# Patient Record
Sex: MALE | NOT HISPANIC OR LATINO | URBAN - METROPOLITAN AREA
[De-identification: names, ages, dates, MRNs, and addresses within clinical notes are randomized per-mention and may not be internally consistent; named-entity substitution may affect disease eponyms.]

---

## 2024-02-01 ENCOUNTER — INPATIENT (INPATIENT)
Facility: HOSPITAL | Age: 29
LOS: 3 days | Discharge: HOME | DRG: 754 | End: 2024-02-05
Attending: PSYCHIATRY & NEUROLOGY | Admitting: PSYCHIATRY & NEUROLOGY
Payer: MEDICAID

## 2024-02-01 VITALS — HEIGHT: 68 IN | WEIGHT: 138.89 LBS

## 2024-02-01 DIAGNOSIS — F32.9 MAJOR DEPRESSIVE DISORDER, SINGLE EPISODE, UNSPECIFIED: ICD-10-CM

## 2024-02-01 LAB
ALBUMIN SERPL ELPH-MCNC: 4.1 G/DL — SIGNIFICANT CHANGE UP (ref 3.3–5)
ALP SERPL-CCNC: 111 U/L — SIGNIFICANT CHANGE UP (ref 40–120)
ALT FLD-CCNC: 36 U/L — SIGNIFICANT CHANGE UP (ref 12–78)
AMPHET UR-MCNC: NEGATIVE — SIGNIFICANT CHANGE UP
ANION GAP SERPL CALC-SCNC: 1 MMOL/L — LOW (ref 5–17)
APAP SERPL-MCNC: < 2 UG/ML (ref 10–30)
APPEARANCE UR: ABNORMAL
AST SERPL-CCNC: 9 U/L — LOW (ref 15–37)
BACTERIA # UR AUTO: NEGATIVE /HPF — SIGNIFICANT CHANGE UP
BARBITURATES UR SCN-MCNC: NEGATIVE — SIGNIFICANT CHANGE UP
BASOPHILS # BLD AUTO: 0.02 K/UL — SIGNIFICANT CHANGE UP (ref 0–0.2)
BASOPHILS NFR BLD AUTO: 0.2 % — SIGNIFICANT CHANGE UP (ref 0–2)
BENZODIAZ UR-MCNC: NEGATIVE — SIGNIFICANT CHANGE UP
BILIRUB SERPL-MCNC: 0.6 MG/DL — SIGNIFICANT CHANGE UP (ref 0.2–1.2)
BILIRUB UR-MCNC: NEGATIVE — SIGNIFICANT CHANGE UP
BUN SERPL-MCNC: 10 MG/DL — SIGNIFICANT CHANGE UP (ref 7–23)
CALCIUM SERPL-MCNC: 9.7 MG/DL — SIGNIFICANT CHANGE UP (ref 8.5–10.1)
CHLORIDE SERPL-SCNC: 107 MMOL/L — SIGNIFICANT CHANGE UP (ref 96–108)
CO2 SERPL-SCNC: 31 MMOL/L — SIGNIFICANT CHANGE UP (ref 22–31)
COCAINE METAB.OTHER UR-MCNC: NEGATIVE — SIGNIFICANT CHANGE UP
COLOR SPEC: SIGNIFICANT CHANGE UP
CREAT SERPL-MCNC: 0.95 MG/DL — SIGNIFICANT CHANGE UP (ref 0.5–1.3)
DIFF PNL FLD: NEGATIVE — SIGNIFICANT CHANGE UP
EGFR: 110 ML/MIN/1.73M2 — SIGNIFICANT CHANGE UP
EOSINOPHIL # BLD AUTO: 0.09 K/UL — SIGNIFICANT CHANGE UP (ref 0–0.5)
EOSINOPHIL NFR BLD AUTO: 0.8 % — SIGNIFICANT CHANGE UP (ref 0–6)
ETHANOL SERPL-MCNC: <10 MG/DL — SIGNIFICANT CHANGE UP (ref 0–10)
GLUCOSE SERPL-MCNC: 108 MG/DL — HIGH (ref 70–99)
GLUCOSE UR QL: NEGATIVE MG/DL — SIGNIFICANT CHANGE UP
HCT VFR BLD CALC: 48.6 % — SIGNIFICANT CHANGE UP (ref 39–50)
HGB BLD-MCNC: 16.3 G/DL — SIGNIFICANT CHANGE UP (ref 13–17)
IMM GRANULOCYTES NFR BLD AUTO: 0.3 % — SIGNIFICANT CHANGE UP (ref 0–0.9)
KETONES UR-MCNC: NEGATIVE MG/DL — SIGNIFICANT CHANGE UP
LEUKOCYTE ESTERASE UR-ACNC: NEGATIVE — SIGNIFICANT CHANGE UP
LYMPHOCYTES # BLD AUTO: 19.2 % — SIGNIFICANT CHANGE UP (ref 13–44)
LYMPHOCYTES # BLD AUTO: 2.2 K/UL — SIGNIFICANT CHANGE UP (ref 1–3.3)
MCHC RBC-ENTMCNC: 29.9 PG — SIGNIFICANT CHANGE UP (ref 27–34)
MCHC RBC-ENTMCNC: 33.5 GM/DL — SIGNIFICANT CHANGE UP (ref 32–36)
MCV RBC AUTO: 89 FL — SIGNIFICANT CHANGE UP (ref 80–100)
METHADONE UR-MCNC: NEGATIVE — SIGNIFICANT CHANGE UP
MONOCYTES # BLD AUTO: 0.7 K/UL — SIGNIFICANT CHANGE UP (ref 0–0.9)
MONOCYTES NFR BLD AUTO: 6.1 % — SIGNIFICANT CHANGE UP (ref 2–14)
NEUTROPHILS # BLD AUTO: 8.39 K/UL — HIGH (ref 1.8–7.4)
NEUTROPHILS NFR BLD AUTO: 73.4 % — SIGNIFICANT CHANGE UP (ref 43–77)
NITRITE UR-MCNC: NEGATIVE — SIGNIFICANT CHANGE UP
OPIATES UR-MCNC: NEGATIVE — SIGNIFICANT CHANGE UP
PCP SPEC-MCNC: SIGNIFICANT CHANGE UP
PCP UR-MCNC: NEGATIVE — SIGNIFICANT CHANGE UP
PH UR: 8 — SIGNIFICANT CHANGE UP (ref 5–8)
PLATELET # BLD AUTO: 221 K/UL — SIGNIFICANT CHANGE UP (ref 150–400)
POTASSIUM SERPL-MCNC: 3.5 MMOL/L — SIGNIFICANT CHANGE UP (ref 3.5–5.3)
POTASSIUM SERPL-SCNC: 3.5 MMOL/L — SIGNIFICANT CHANGE UP (ref 3.5–5.3)
PROT SERPL-MCNC: 7.5 GM/DL — SIGNIFICANT CHANGE UP (ref 6–8.3)
PROT UR-MCNC: SIGNIFICANT CHANGE UP MG/DL
RBC # BLD: 5.46 M/UL — SIGNIFICANT CHANGE UP (ref 4.2–5.8)
RBC # FLD: 12.2 % — SIGNIFICANT CHANGE UP (ref 10.3–14.5)
RBC CASTS # UR COMP ASSIST: 0 /HPF — SIGNIFICANT CHANGE UP (ref 0–4)
SALICYLATES SERPL-MCNC: <1.7 MG/DL (ref 2.8–20)
SARS-COV-2 RNA SPEC QL NAA+PROBE: SIGNIFICANT CHANGE UP
SODIUM SERPL-SCNC: 139 MMOL/L — SIGNIFICANT CHANGE UP (ref 135–145)
SP GR SPEC: 1.03 — SIGNIFICANT CHANGE UP (ref 1–1.03)
SQUAMOUS # UR AUTO: 0 /HPF — SIGNIFICANT CHANGE UP (ref 0–5)
THC UR QL: POSITIVE — SIGNIFICANT CHANGE UP
TSH SERPL-MCNC: 0.88 UU/ML — SIGNIFICANT CHANGE UP (ref 0.34–4.82)
UROBILINOGEN FLD QL: 1 MG/DL — SIGNIFICANT CHANGE UP (ref 0.2–1)
WBC # BLD: 11.43 K/UL — HIGH (ref 3.8–10.5)
WBC # FLD AUTO: 11.43 K/UL — HIGH (ref 3.8–10.5)
WBC UR QL: 0 /HPF — SIGNIFICANT CHANGE UP (ref 0–5)

## 2024-02-01 PROCEDURE — 80061 LIPID PANEL: CPT

## 2024-02-01 PROCEDURE — 83036 HEMOGLOBIN GLYCOSYLATED A1C: CPT

## 2024-02-01 PROCEDURE — 93010 ELECTROCARDIOGRAM REPORT: CPT

## 2024-02-01 PROCEDURE — 36415 COLL VENOUS BLD VENIPUNCTURE: CPT

## 2024-02-01 PROCEDURE — 99285 EMERGENCY DEPT VISIT HI MDM: CPT

## 2024-02-01 PROCEDURE — 84443 ASSAY THYROID STIM HORMONE: CPT

## 2024-02-01 RX ADMIN — Medication 1 MILLIGRAM(S): at 13:44

## 2024-02-01 NOTE — ED PROVIDER NOTE - OBJECTIVE STATEMENT
27 yo male w/no reported psychiatric history presents to the ED requesting mental health evaluation. Pt states for the last 4 months he has been feeling more down and depressed due to some stressors in his life. Pt recently  fro his wife, is having difficulties with immigration, with keeping his job, and with his living situation. Pt feels like "everything is stacking up". Pt has never felt like this in the past, and states he is sleeping in 30-45 minutes intervals each night. Reporting some auditory hallucinations "like the walls are breathing", and states he hears some voices in his head. Reporting recent 35 lb weight loss over the last 4 months as well. Does not have a psychiatrist that he sees. Pt used to use marijuana but does not anymore, denies any other drug use. Pt also states has been feeling more anxious lately, when he gets chest pressure, shaky, and has difficulty breathing.

## 2024-02-01 NOTE — BH PATIENT PROFILE - BRADEN MOISTURE
Spoke with patient and advised her that her home health was already approved again and they had set her up for an appt for 11/29/2022. I advised her to call [facility] to discuss setting up appts.   (4) rarely moist

## 2024-02-01 NOTE — ED BEHAVIORAL HEALTH ASSESSMENT NOTE - RISK ASSESSMENT
High acute risk: severe depression anxiety, insomnia, hopelessness anhedonia, patient is depression and thoughts about dying, no motivation to continue living, poor or no social supports, homeless, generalized, with issues with immigration office patient is at high acute suicide risk.  In addition he cannot function, he is not moving out of bed and doing nothing.  Increased  Long-term risk based on criteria describing acute risk.  Protective factors: None known.  Mitigation of risk:: psychiatry treatment to start the medication, psychotherapy and safety plan.

## 2024-02-01 NOTE — ED PROVIDER NOTE - PHYSICAL EXAMINATION
GENERAL: A&Ox4, non-toxic appearing, no acute distress  HEENT: NCAT, EOMI, oral mucosa moist, normal conjunctiva  RESP: CTAB, no respiratory distress, no wheezes/rhonchi/rales, speaking in full sentences  CV: RRR, no murmurs/rubs/gallops  ABDOMEN: soft, non-tender, non-distended, no guarding, no rebound tenderness  MSK: no visible deformities  NEURO: no focal sensory or motor deficits, CN 2-12 grossly intact  SKIN: warm, normal color, well perfused, no rash  PSYCH: flat affect, depressed

## 2024-02-01 NOTE — ED ADULT TRIAGE NOTE - CHIEF COMPLAINT QUOTE
patient ambulatory into ED requesting "mental health assessment". pt endorses feeling of depression over the past 4 months as well as suicidal thoughts. denies plan or access to weapons. endorses stressor of separation from wife, finances, work, immigration, etc. pt follows with a therapist but is non-compliant, states it has been "on and off". denies remote dx of mental health illness or family hx of mental health illness. pt reports it feels like his life is "like a movie", endorses hearing voices but denies command auditory hallucinations, also denies visual hallucinations. states "I wish I got this addressed months ago". seeking psychiatric evaluation. 1:1 initiated from triage, pt to be brought to behavioral health room.

## 2024-02-01 NOTE — ED BEHAVIORAL HEALTH ASSESSMENT NOTE - HPI (INCLUDE ILLNESS QUALITY, SEVERITY, DURATION, TIMING, CONTEXT, MODIFYING FACTORS, ASSOCIATED SIGNS AND SYMPTOMS)
PT is a 29 YO Bots[place] male,  but  from wife who resides in [facility], unemployed, limited to no support, experiencing  severely  depressed mood in the light of multiple stressors, including conflicts with [facility] and [facility] to [facility], immigration  problems, unemployment, limited support, no family fr the last 3 months that is getting progressively worse to the point  of being hopeless and thinking that life makes no sense and there is no point  of living any more, came to ED seeking help.  Patient is reporting very severe depression, was lasting at least 3 months, after his wife left him in the life of marital conflicts and the history obtained further protection against him, after that he lost job [place] and lost his apartment.  He claimed to [place] 3 days ago to stay with his friend Parish, telephone number [phone] in [place].  Patient states that he cannot sleep, energy level is low, he has no motivation and states in bed, he lost appetite and lost 35 pounds in the last 3 months, he still has poor appetite and cannot eat, he is not able to engage in any activity, his concentration is poor is staring, he has thoughts of guilt and life not being worth living with wishes to die.  He denies that currently having thoughts of plan to kill himself or intent.  He is very anxious and repeats that is getting worse.  In ED he is shaking and saying he is very anxious because of all environment and all the situation.  He is blaming vomit emergency room for his anxiety despite the full stressors happening to him.  He denies hearing voices but states that he has his own voices that bother him.  He states " it is not like voices are telling me something I have these bad thoughts".  He repeats he has no hope for future and he wants to die.  Patient denies paranoid thinking or any other delusional thinking.  He denies history of trauma and states that he does not drink and does not smoke at this point of time.  He used to smoke and drink but not anymore.  Patient does not family in [facility], his mother lives in [place] and he has no siblings.  His father is not in the picture and he does not know where he is.  As his collateral information he provides the telephone number of his friend Parish: [phone], his wife (he is still ) telephone n who whom he describes as his umber [phone] and that he is a family friend Betsy brother because he is close to him and his telephone is [phone] who resides in [place].  [place] came [place] 3 days ago. Patient has a interview in immigration office on January 7, 2024

## 2024-02-01 NOTE — ED BEHAVIORAL HEALTH ASSESSMENT NOTE - NSACTIVEVENT_PSY_ALL_CORE
Triggering events leading to humiliation, shame, and/or despair (e.g., Loss of relationship, financial or health status) (real or anticipated)/Pending incarceration or homelessness/Inadequate social supports/Recent onset of psychiatric illness

## 2024-02-01 NOTE — ED PROVIDER NOTE - CARE PLAN
1 Principal Discharge DX:	Current episode of major depressive disorder without prior episode, unspecified depression episode severity

## 2024-02-01 NOTE — ED PROVIDER NOTE - NS_BEDUNITTYPES_ED_ALL_ED
Potential access sites were evaluated for patency using ultrasound.   The right jugular vein was selected. Access was obtained under with Sonosite guidance using a standard 18 guage needle with direct visualization of needle entry.      PSYCHIATRY

## 2024-02-01 NOTE — ED BEHAVIORAL HEALTH ASSESSMENT NOTE - NSBHMSESPEECH_PSY_A_CORE
Normal volume, rate, productivity, spontaneity and articulation Adjacent Tissue Transfer Text: The defect edges were debeveled with a #15 scalpel blade.  Given the location of the defect and the proximity to free margins an adjacent tissue transfer was deemed most appropriate.  Using a sterile surgical marker, an appropriate flap was drawn incorporating the defect and placing the expected incisions within the relaxed skin tension lines where possible.    The area thus outlined was incised deep to adipose tissue with a #15 scalpel blade.  The skin margins were undermined to an appropriate distance in all directions utilizing iris scissors.

## 2024-02-01 NOTE — ED ADULT NURSE NOTE - ACTIVATING EVENTS/STRESSORS
Triggering events leading to humiliation, shame, and/or despair (e.g. Loss of relationship, financial or health status) (real or anticipated)/Legal problems/Inadequate social supports

## 2024-02-01 NOTE — ED BEHAVIORAL HEALTH ASSESSMENT NOTE - PAST PSYCHOTROPIC MEDICATION
Patient states he saw psychiatrist once in [place] 3 months ago and that he was prescribed some medication,

## 2024-02-01 NOTE — ED PROVIDER NOTE - PROGRESS NOTE DETAILS
All labs personally reviewed.  Patient has mild leukocytosis without any infectious symptoms, urinalysis negative, CMP nonactionable, drug screen pending, COVID-negative.  Patient seen by psychiatry team with plan for voluntary admission.  Patient to be admitted to [facility].

## 2024-02-01 NOTE — ED ADULT NURSE NOTE - NSFALLHARMRISKINTERV_ED_ALL_ED

## 2024-02-01 NOTE — ED ADULT NURSE NOTE - OBJECTIVE STATEMENT
Pt ambulatory to ED seeking psych eval. Pt reports having increased feeling of depression and anxiety over few weeks. Pt reports he recently moved from [place],  from wife, undergoing immigrational issues. Pt reports staying with his friend who advised him to come to  ED to be evaluated by Psychiatrist. Pt reports feeling of suicidal ideation, without a plan. Denies any previous attempts. Denies HI. Pt reports brief hospitalization at a psych clinic in [place] last year. Reports feelings of hopelessness about his situation- no medical insurance and unemployed. Pt visibly anxious, fidgeting, medicated with Lorazepam PO. EKG in progress, labs sent. Plan of care explained to pt with verbalized understanding. Pending psych re eval and dispo at this time. On constant observation for safety. Medications secured in pharmacy, seal slip in pt chart #[id number]

## 2024-02-01 NOTE — ED BEHAVIORAL HEALTH ASSESSMENT NOTE - SUMMARY
PT is a 29 YO [place] male,  but  from wife who resides in [facility], unemployed, limited to no support, experiencing  severely  depressed mood in the light of multiple stressors, including conflicts with [facility] and moved to [facility], immigration  problems, unemployment, limited support, no family fr the last 3 months that is getting progressively worse to the point  of being hopeless and thinking that life makes no sense and there is no point  of living any more, came to ED seeking help.  Patient is reporting very severe depression, was lasting at least 3 months, after his wife left him in the life of marital conflicts and the history obtained further protection against him, after that he lost job [place] and lost his apartment.  He claimed to [place] 3 days ago to stay with his friend Parish, telephone number [phone] in [place].  Patient states that he cannot sleep, energy level is low, he has no motivation and states in bed, he lost appetite and lost 35 pounds in the last 3 months, he still has poor appetite and cannot eat, he is not able to engage in any activity, his concentration is poor is staring, he has thoughts of guilt and life not being worth living with wishes to die.  He denies that currently having thoughts of plan to kill himself or intent.  He is very anxious and repeats that is getting worse.  In ED he is shaking and saying he is very anxious because of all environment and all the situation.  He is blaming vomit emergency room for his anxiety despite the full stressors happening to him.  He denies hearing voices but states that he has his own voices that bother him.  He states " it is not like voices are telling me something I have these bad thoughts".  He repeats he has no hope for future and he wants to die.  Patient denies paranoid thinking or any other delusional thinking.  He denies history of trauma and states that he does not drink and does not smoke at this point of time.  He used to smoke and drink but not anymore.  Patient does not family in [facility], his mother lives in [place] and he has no siblings.  His father is not in the picture and he does not know where he is.  As his collateral information he provides the telephone number of his friend Parish: [phone], his wife (he is still ) telephone n who whom he describes as his umber [phone] and that he is a family friend Betsy brother because he is close to him and his telephone is [phone] who resides in [place].  Patient came from room [place] to [facility] 3 days ago. Patient has a interview in immigration office on January 7, 2024    Considering all of the above, severe depression anxiety, insomnia, hopelessness anhedonia, patient is signs depression and thoughts about dying, no motivation to continue living, poor or no social supports, homeless, generalized, with issues with immigration office patient is at high acute suicide risk.  In addition he cannot function, he is not moving out of bed and doing nothing.    This is the first episode of depression, he is not an indication, he is undecided and ambivalent about treatment.    Plan:    Hold for reassessment pending full medical workup, labs, UTI, U tox, EKG COVID testing, respiratory infection testing.    Patient will benefit from inpatient psychiatry admission and he thinks about signs of voluntarily.    In case the patient injects 1 to admission, it is my opinion that he is high suicide risk, and unable to function and provide care for himself and meets criteria for emergency admission.    Provide Ativan 1 mg p.o. every 6 hours as needed for anxiety,.    Care coordinated with JOHAN Berrios in signout to follow-up on patient once medical workup is completed.

## 2024-02-01 NOTE — ED BEHAVIORAL HEALTH ASSESSMENT NOTE - DESCRIPTION
Patient is very anxious with poor response to reassurance redirection.  Requires Ativan 1 mg as needed for agitation. Patient is jobless, homeless, unemployed,  from wife and having immigration issues. DENIES

## 2024-02-01 NOTE — BH PATIENT PROFILE - FALL HARM RISK - UNIVERSAL INTERVENTIONS
Bed in lowest position, wheels locked, appropriate side rails in place/Call bell, personal items and telephone in reach/Instruct patient to call for assistance before getting out of bed or chair/Non-slip footwear when patient is out of bed/[place] to call system/Physically safe environment - no spills, clutter or unnecessary equipment/Purposeful Proactive Rounding/Room/bathroom lighting operational, light cord in reach

## 2024-02-01 NOTE — ED PROVIDER NOTE - CLINICAL SUMMARY MEDICAL DECISION MAKING FREE TEXT BOX
27 yo male with depression, likely due to current difficult life events. Plan for medical clearance, psych consult, and reassess.

## 2024-02-02 DIAGNOSIS — F43.20 ADJUSTMENT DISORDER, UNSPECIFIED: ICD-10-CM

## 2024-02-02 LAB
A1C WITH ESTIMATED AVERAGE GLUCOSE RESULT: 5.4 % — SIGNIFICANT CHANGE UP (ref 4–5.6)
CHOLEST SERPL-MCNC: 170 MG/DL — SIGNIFICANT CHANGE UP
ESTIMATED AVERAGE GLUCOSE: 108 MG/DL — SIGNIFICANT CHANGE UP (ref 68–114)
HDLC SERPL-MCNC: 41 MG/DL — SIGNIFICANT CHANGE UP
LIPID PNL WITH DIRECT LDL SERPL: 113 MG/DL — HIGH
NON HDL CHOLESTEROL: 129 MG/DL — SIGNIFICANT CHANGE UP
TRIGL SERPL-MCNC: 89 MG/DL — SIGNIFICANT CHANGE UP
TSH SERPL-MCNC: 1.47 UU/ML — SIGNIFICANT CHANGE UP (ref 0.34–4.82)

## 2024-02-02 PROCEDURE — 99221 1ST HOSP IP/OBS SF/LOW 40: CPT

## 2024-02-02 PROCEDURE — 99223 1ST HOSP IP/OBS HIGH 75: CPT

## 2024-02-02 RX ORDER — MIRTAZAPINE 45 MG/1
7.5 TABLET, ORALLY DISINTEGRATING ORAL AT BEDTIME
Refills: 0 | Status: DISCONTINUED | OUTPATIENT
Start: 2024-02-02 | End: 2024-02-04

## 2024-02-02 RX ADMIN — Medication 1 MILLIGRAM(S): at 00:20

## 2024-02-02 RX ADMIN — Medication 1 MILLIGRAM(S): at 12:52

## 2024-02-02 RX ADMIN — MIRTAZAPINE 7.5 MILLIGRAM(S): 45 TABLET, ORALLY DISINTEGRATING ORAL at 20:29

## 2024-02-02 NOTE — BH INPATIENT PSYCHIATRY ASSESSMENT NOTE - NSBHCHARTREVIEWINVESTIGATE_PSY_A_CORE FT
< from: 12 Lead ECG (02.01.24 @ 14:13) >    Ventricular Rate 98 BPM    Atrial Rate 98 BPM    P-R Interval 134 ms    QRS Duration 88 ms    Q-T Interval 346 ms    QTC Calculation(Bazett) 441 ms    P Axis 55 degrees    R Axis 61 degrees    T Axis 40 degrees    Diagnosis Line Normal sinus rhythm  Normal ECG  No previous ECGs available  Confirmed by MD RIANNA, JANETT ([id number]) on 2/1/2024 6:24:16 PM    < end of copied text >

## 2024-02-02 NOTE — BH INPATIENT PSYCHIATRY ASSESSMENT NOTE - NSBHASSESSSUMMFT_PSY_ALL_CORE
Patient is reporting very severe depression, was lasting at least 3 months, after his wife left him in the life of marital conflicts and the history obtained further protection against him, after that he lost job [place] and lost his apartment.  He claimed to [place] 3 days ago to stay with his friend Parish, telephone number [phone] in [place].  Patient states that he cannot sleep, energy level is low, he has no motivation and states in bed, he lost appetite and lost 35 pounds in the last 3 months, he still has poor appetite and cannot eat, he is not able to engage in any activity, his concentration is poor is staring, he has thoughts of guilt and life not being worth living with wishes to die.  He denies that currently having thoughts of plan to kill himself or intent.  He is very anxious and repeats that is getting worse.  In ED he is shaking and saying he is very anxious because of all environment and all the situation.  He is blaming vomit emergency room for his anxiety despite the full stressors happening to him.  He denies hearing voices but states that he has his own voices that bother him.  He states " it is not like voices are telling me something I have these bad thoughts".  He repeats he has no hope for future and he wants to die.  Patient denies paranoid thinking or any other delusional thinking.    He denies history of trauma and states that he does not drink and does not smoke at this point of time.  He used to smoke and drink but not anymore.  Patient does not have family in [facility], his mother lives in [place] and he has no siblings.  His father is not in the picture and he does not know where he is.  As his collateral information he provides the telephone number of his friend Parish: [phone], his wife (he is still ) telephone n who whom he describes as his umber [phone] and that he is a family friend Betsy brother because he is close to him and his telephone is [phone] who resides in [place].  [place] came [place] to [place]  3 days ago. Patient has a interview in immigration office on January 7, 2024   Patient is reporting feeling depressed, initially has SI, no SI now, and no prior SA. he feels stressed due to loss of  Job/Home/Separation from wife/impending divorce/immigration papers etc. with poor sleep for past 3 months. He feels that he need to calm down and has no SI now, agreed to take Remeron 7.5 mg HS and discussed meds s/e , benefits and advantages. To be observed for few days and he also has immigration interview on 02/07/2024, and feels that he has to leave early on the 5th of February to help with the . He also agreed to go for after care f/u as per SW referral.

## 2024-02-02 NOTE — BH SOCIAL WORK INITIAL PSYCHOSOCIAL EVALUATION - NSBHSATHC_PSY_A_CORE FT
Pt. reports heavy cannabis use when attending college. He reports reduced use in past 3 months. He endorsed last use approx. 4 days ago.

## 2024-02-02 NOTE — BH SOCIAL WORK INITIAL PSYCHOSOCIAL EVALUATION - INSURANCE HELP - DETAILS
Pt. with no active health insurance. He states he is not eligible due to immigration status. Referred to Financial Counseling.

## 2024-02-02 NOTE — H&P ADULT - NSHPLABSRESULTS_GEN_ALL_CORE
Urinalysis Basic - ( 2024 13:11 )    Color: Dark Yellow / Appearance: Turbid / S.027 / pH: x  Gluc: 108 mg/dL / Ketone: Negative mg/dL  / Bili: Negative / Urobili: 1.0 mg/dL   Blood: x / Protein: Trace mg/dL / Nitrite: Negative   Leuk Esterase: Negative / RBC: 0 /HPF / WBC 0 /HPF   Sq Epi: x / Non Sq Epi: 0 /HPF / Bacteria: Negative /HPF    2024 13:11    139    |  107    |  10     ----------------------------<  108    3.5     |  31     |  0.95     Ca    9.7        2024 13:11    TPro  7.5    /  Alb  4.1    /  TBili  0.6    /  DBili  x      /  AST  9      /  ALT  36     /  AlkPhos  111    2024 13:11  LIVER FUNCTIONS - ( 2024 13:11 )  Alb: 4.1 g/dL / Pro: 7.5 gm/dL / ALK PHOS: 111 U/L / ALT: 36 U/L / AST: 9 U/L / GGT: x         CBC Full  -  ( 2024 13:11 )  WBC Count : 11.43 K/uL  Hemoglobin : 16.3 g/dL  Hematocrit : 48.6 %  Platelet Count - Automated : 221 K/uL  Mean Cell Volume : 89.0 fl  Mean Cell Hemoglobin : 29.9 pg  Mean Cell Hemoglobin Concentration : 33.5 gm/dL  Auto Neutrophil # : 8.39 K/uL  Auto Lymphocyte # : 2.20 K/uL  Auto Monocyte # : 0.70 K/uL  Auto Eosinophil # : 0.09 K/uL  Auto Basophil # : 0.02 K/uL  Auto Neutrophil % : 73.4 %  Auto Lymphocyte % : 19.2 %  Auto Monocyte % : 6.1 %  Auto Eosinophil % : 0.8 %  Auto Basophil % : 0.2 %

## 2024-02-02 NOTE — BH SOCIAL WORK INITIAL PSYCHOSOCIAL EVALUATION - MONEY FOR FOOD - DETAILS
Pt. reports no income at this time and worries about supporting himself. SW to f/u with [facility] referral.

## 2024-02-02 NOTE — BH SOCIAL WORK INITIAL PSYCHOSOCIAL EVALUATION - NSPTSTATEDGOAL_PSY_ALL_CORE
3-point gait
"To help get medication and learn coping for current stress. I was looking for someone to talk to."

## 2024-02-02 NOTE — BH INPATIENT PSYCHIATRY ASSESSMENT NOTE - NSBHCHARTREVIEWLAB_PSY_A_CORE FT
16.3   11.43 )-----------( 221      ( 01 Feb 2024 13:11 )             48.6   02-01    139  |  107  |  10  ----------------------------<  108<H>  3.5   |  31  |  0.95    Ca    9.7      01 Feb 2024 13:11    TPro  7.5  /  Alb  4.1  /  TBili  0.6  /  DBili  x   /  AST  9<L>  /  ALT  36  /  AlkPhos  111  02-01

## 2024-02-02 NOTE — BH INPATIENT PSYCHIATRY ASSESSMENT NOTE - CURRENT MEDICATION
MEDICATIONS  (STANDING):  mirtazapine 7.5 milliGRAM(s) Oral at bedtime    MEDICATIONS  (PRN):  LORazepam     Tablet 1 milliGRAM(s) Oral every 6 hours PRN Anxiety

## 2024-02-02 NOTE — BH SOCIAL WORK INITIAL PSYCHOSOCIAL EVALUATION - NSBHBARRIERS_PSY_ALL_CORE
Pt. identifies stressors w/ his wife and inability to talk to her due to OOP as barriers/Financial difficulties/Lack of support

## 2024-02-02 NOTE — BH INPATIENT PSYCHIATRY ASSESSMENT NOTE - HPI (INCLUDE ILLNESS QUALITY, SEVERITY, DURATION, TIMING, CONTEXT, MODIFYING FACTORS, ASSOCIATED SIGNS AND SYMPTOMS)
HPI: PT is a 29 YO  male,  but  from wife who resides in [facility], unemployed, limited to no support, experiencing  severely  depressed mood in the light of multiple stressors, including conflicts with [facility] and [facility] to [facility], immigration  problems, unemployment, limited support, no family fr the last 3 months that is getting progressively worse to the point  of being hopeless and thinking that life makes no sense and there is no point  of living any more, came to ED seeking help.    Patient is reporting very severe depression, was lasting at least 3 months, after his wife left him in the life of marital conflicts and the history obtained further protection against him, after that he lost job [place] and lost his apartment.  He claimed to [place] 3 days ago to stay with his friend Parish, telephone number [phone] in [place].  Patient states that he cannot sleep, energy level is low, he has no motivation and states in bed, he lost appetite and lost 35 pounds in the last 3 months, he still has poor appetite and cannot eat, he is not able to engage in any activity, his concentration is poor is staring, he has thoughts of guilt and life not being worth living with wishes to die.  He denies that currently having thoughts of plan to kill himself or intent.  He is very anxious and repeats that is getting worse.  In ED he is shaking and saying he is very anxious because of all environment and all the situation.  He is blaming vomit emergency room for his anxiety despite the full stressors happening to him.  He denies hearing voices but states that he has his own voices that bother him.  He states " it is not like voices are telling me something I have these bad thoughts".  He repeats he has no hope for future and he wants to die.  Patient denies paranoid thinking or any other delusional thinking.  He denies history of trauma and states that he does not drink and does not smoke at this point of time.  He used to smoke and drink but not anymore.  Patient does not family in [facility], his mother lives in [place] and he has no siblings.  His father is not in the picture and he does not know where he is.  As his collateral information he provides the telephone number of his friend Parish: [phone], his wife (he is still ) telephone n who whom he describes as his umber [phone] and that he is a family friend Betsy brother because he is close to him and his telephone is [phone] who resides in [place].  Patient came [place] to [place] 3 days ago. Patient has a interview in immigration office on January 7, 2024

## 2024-02-02 NOTE — BH SOCIAL WORK INITIAL PSYCHOSOCIAL EVALUATION - NSBHSAALC_PSY_A_CORE FT
Pt. endorses heavy ETOH use when employed in a [employer]. He stopped alcohol drink few months ago, last use 3 days ago 1 corona beer.

## 2024-02-02 NOTE — PSYCHIATRIC REHAB INITIAL EVALUATION - NSBHEDULEVEL_PSY_ALL_CORE
Addended by: EDDY WILDE on: 11/21/2023 03:16 PM     Modules accepted: Orders    
Addended by: EDDY WILDE on: 11/21/2023 03:18 PM     Modules accepted: Orders    
High (Secondary) School

## 2024-02-02 NOTE — BH SOCIAL WORK INITIAL PSYCHOSOCIAL EVALUATION - NSBHHOUSECOMMENTFT_PSY_ALL_CORE
Pt. is currently staying w/ friend Parish in [place]:  [address], [place] [address]. Pt. reports he can return there at d/c.  He states prior he was living with his wife in [place] but since separation he and wife lost the apt. and have been in separate addresses. Pt. states he has place also to stay with friend in [place] for his upcoming court appt. He intends to return to [place] until he can figure out his next housing.  Pt.Cell Phone # is [phone].

## 2024-02-02 NOTE — H&P ADULT - NSHPPHYSICALEXAM_GEN_ALL_CORE
Vital Signs Last 24 Hrs  T(C): 36.7 (02 Feb 2024 08:48), Max: 37.4 (01 Feb 2024 12:24)  T(F): 98 (02 Feb 2024 08:48), Max: 99.3 (01 Feb 2024 12:24)  HR: 79 (01 Feb 2024 23:24) (79 - 96)  BP: 126/82 (01 Feb 2024 23:24) (115/78 - 130/87)  BP(mean): 92 (01 Feb 2024 22:02) (83 - 100)  RR: 16 (02 Feb 2024 08:48) (16 - 19)  SpO2: 100% (02 Feb 2024 08:48) (97% - 100%)    Parameters below as of 01 Feb 2024 22:02  Patient On (Oxygen Delivery Method): room air    HEENT:   pupils equal and reactive, EOMI, no oropharyngeal lesions, erythema, exudates, oral thrush    NECK:   supple, no carotid bruits, no palpable lymph nodes, no thyromegaly    CV:  +S1, +S2, regular, no murmurs or rubs    RESP:   lungs clear to auscultation bilaterally, no wheezing, rales, rhonchi, good air entry bilaterally    BREAST:  not examined    GI:  abdomen soft, non-tender, non-distended, normal BS, no bruits, no abdominal masses, no palpable masses    RECTAL:  not examined    :  not examined    MSK:   normal muscle tone, no atrophy, no rigidity, no contractions    EXT:   no clubbing, no cyanosis, no edema, no calf pain, swelling or erythema    VASCULAR:  pulses equal and symmetric in the upper and lower extremities    NEURO:  AAOX3, no focal neurological deficits, follows all commands, able to move extremities spontaneously    SKIN:  no ulcers, lesions or rashes

## 2024-02-02 NOTE — BH SOCIAL WORK INITIAL PSYCHOSOCIAL EVALUATION - JOB HELP - DETAILS
Pt. 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 He reports since  from his wife, he lost his friends also and his job. 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

## 2024-02-02 NOTE — BH SOCIAL WORK INITIAL PSYCHOSOCIAL EVALUATION - NSBHGOALSUCCESSFT_PSY_ALL_CORE
Pt. 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

## 2024-02-02 NOTE — BH SOCIAL WORK INITIAL PSYCHOSOCIAL EVALUATION - NSHIGHRISKBEHFT_PSY_ALL_CORE
Pt. reports hx. 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 He also reports heavy cannabis use during college years.
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
## 2024-02-02 NOTE — BH INPATIENT PSYCHIATRY ASSESSMENT NOTE - NSCOMMENTSUICRISKFACT_PSY_ALL_CORE
Low risk-No prior SI/SA, denied drug abuse, stopped drinking  Alcohol months ago, last drink beer Corona 1 bottle 3 days earlier

## 2024-02-02 NOTE — BH INPATIENT PSYCHIATRY ASSESSMENT NOTE - NSBHMETABOLIC_PSY_ALL_CORE_FT
BMI: BMI (kg/m2): 23 (02-01-24 @ 23:24)  HbA1c:   Glucose:   BP: 126/82 (02-01-24 @ 23:24) (115/78 - 130/87)Vital Signs Last 24 Hrs  T(C): 36.7 (02-02-24 @ 08:48), Max: 37.4 (02-01-24 @ 12:24)  T(F): 98 (02-02-24 @ 08:48), Max: 99.3 (02-01-24 @ 12:24)  HR: 79 (02-01-24 @ 23:24) (79 - 96)  BP: 126/82 (02-01-24 @ 23:24) (115/78 - 130/87)  BP(mean): 92 (02-01-24 @ 22:02) (83 - 100)  RR: 16 (02-02-24 @ 08:48) (16 - 19)  SpO2: 100% (02-02-24 @ 08:48) (97% - 100%)    Orthostatic VS  02-02-24 @ 08:48  Lying BP: --/-- HR: --  Sitting BP: 117/81 HR: 88  Standing BP: 128/83 HR: 96  Site: upper right arm  Mode: electronic    Lipid Panel: Date/Time: 02-02-24 @ 08:07  Cholesterol, Serum: 170  LDL Cholesterol Calculated: 113  HDL Cholesterol, Serum: 41  Total Cholesterol/HDL Ration Measurement: --  Triglycerides, Serum: 89   BMI: BMI (kg/m2): 23 (02-01-24 @ 23:24)  HbA1c: A1C with Estimated Average Glucose Result: 5.4 % (02-02-24 @ 08:07)    Glucose:   BP: 126/82 (02-01-24 @ 23:24) (115/78 - 130/87)Vital Signs Last 24 Hrs  T(C): 36.7 (02-02-24 @ 08:48), Max: 37.2 (02-01-24 @ 15:19)  T(F): 98 (02-02-24 @ 08:48), Max: 98.9 (02-01-24 @ 15:19)  HR: 79 (02-01-24 @ 23:24) (79 - 92)  BP: 126/82 (02-01-24 @ 23:24) (115/78 - 126/82)  BP(mean): 92 (02-01-24 @ 22:02) (83 - 92)  RR: 16 (02-02-24 @ 08:48) (16 - 18)  SpO2: 100% (02-02-24 @ 08:48) (98% - 100%)    Orthostatic VS  02-02-24 @ 08:48  Lying BP: --/-- HR: --  Sitting BP: 117/81 HR: 88  Standing BP: 128/83 HR: 96  Site: upper right arm  Mode: electronic    Lipid Panel: Date/Time: 02-02-24 @ 08:07  Cholesterol, Serum: 170  LDL Cholesterol Calculated: 113  HDL Cholesterol, Serum: 41  Total Cholesterol/HDL Ration Measurement: --  Triglycerides, Serum: 89

## 2024-02-02 NOTE — H&P ADULT - ASSESSMENT
28 year old man with recent social stressors, separation from wife who has restraining order against him, now presents with sergey, for suicidal ideation.   Currently under inpatient psychiatry service and treatment.       Patient with no acute medical problems  Reccomend to resume care as per pyschiatry for his depression and anxiety      Medicine will sign off , please call prn if any new medical concerns arise.

## 2024-02-02 NOTE — BH SAFETY PLAN - ENVIRONMENT SAFETY 2:
I will call Parish, [phone], Betsy, [phone] or [phone] immediately if I am having  thoughts t harm myself.

## 2024-02-02 NOTE — BH SOCIAL WORK INITIAL PSYCHOSOCIAL EVALUATION - NSBHCHILDRESP_PSY_ALL_CORE
Pt.  but  to wife Joann Styles, no children, has mother in [place], no siblings, no contact w/ father./No
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
## 2024-02-02 NOTE — BH INPATIENT PSYCHIATRY ASSESSMENT NOTE - NSBHCHARTREVIEWVS_PSY_A_CORE FT
Vital Signs Last 24 Hrs  T(C): 36.7 (02-02-24 @ 08:48), Max: 37.4 (02-01-24 @ 12:24)  T(F): 98 (02-02-24 @ 08:48), Max: 99.3 (02-01-24 @ 12:24)  HR: 79 (02-01-24 @ 23:24) (79 - 96)  BP: 126/82 (02-01-24 @ 23:24) (115/78 - 130/87)  BP(mean): 92 (02-01-24 @ 22:02) (83 - 100)  RR: 16 (02-02-24 @ 08:48) (16 - 19)  SpO2: 100% (02-02-24 @ 08:48) (97% - 100%)    Orthostatic VS  02-02-24 @ 08:48  Lying BP: --/-- HR: --  Sitting BP: 117/81 HR: 88  Standing BP: 128/83 HR: 96  Site: upper right arm  Mode: electronic   Vital Signs Last 24 Hrs  T(C): 36.7 (02-02-24 @ 08:48), Max: 37.2 (02-01-24 @ 15:19)  T(F): 98 (02-02-24 @ 08:48), Max: 98.9 (02-01-24 @ 15:19)  HR: 79 (02-01-24 @ 23:24) (79 - 92)  BP: 126/82 (02-01-24 @ 23:24) (115/78 - 126/82)  BP(mean): 92 (02-01-24 @ 22:02) (83 - 92)  RR: 16 (02-02-24 @ 08:48) (16 - 18)  SpO2: 100% (02-02-24 @ 08:48) (98% - 100%)    Orthostatic VS  02-02-24 @ 08:48  Lying BP: --/-- HR: --  Sitting BP: 117/81 HR: 88  Standing BP: 128/83 HR: 96  Site: upper right arm  Mode: electronic
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
## 2024-02-02 NOTE — BH INPATIENT PSYCHIATRY ASSESSMENT NOTE - NSTXDCOTHRGOAL_PSY_ALL_CORE
Pt. will have appt. for ongoing mental health care within 5-7 days of discharge.   Pt. will have safe housing upon discharge.   Pt. is insured and benefits are in place.   SW to explore w/ pt. the interest and need for substance use referral for discharge.

## 2024-02-03 PROCEDURE — 99232 SBSQ HOSP IP/OBS MODERATE 35: CPT

## 2024-02-03 RX ADMIN — MIRTAZAPINE 7.5 MILLIGRAM(S): 45 TABLET, ORALLY DISINTEGRATING ORAL at 19:59

## 2024-02-03 RX ADMIN — Medication 1 MILLIGRAM(S): at 23:01

## 2024-02-04 PROCEDURE — 99232 SBSQ HOSP IP/OBS MODERATE 35: CPT

## 2024-02-04 RX ORDER — MIRTAZAPINE 45 MG/1
15 TABLET, ORALLY DISINTEGRATING ORAL AT BEDTIME
Refills: 0 | Status: DISCONTINUED | OUTPATIENT
Start: 2024-02-04 | End: 2024-02-05

## 2024-02-04 RX ADMIN — MIRTAZAPINE 15 MILLIGRAM(S): 45 TABLET, ORALLY DISINTEGRATING ORAL at 20:27

## 2024-02-05 VITALS — OXYGEN SATURATION: 100 % | TEMPERATURE: 98 F | RESPIRATION RATE: 16 BRPM

## 2024-02-05 PROCEDURE — 99239 HOSP IP/OBS DSCHRG MGMT >30: CPT

## 2024-02-05 RX ORDER — MIRTAZAPINE 45 MG/1
1 TABLET, ORALLY DISINTEGRATING ORAL
Qty: 14 | Refills: 0
Start: 2024-02-05 | End: 2024-02-18

## 2024-02-05 NOTE — BH DISCHARGE NOTE NURSING/SOCIAL WORK/PSYCH REHAB - NSDPDISTO_PSY_ALL_CORE
Pt. will be staying w/ friend on [place]:  [address], [place] [address]  Pt. cell: [phone]  Friend Parish Wyman: [phone]/Home

## 2024-02-05 NOTE — BH DISCHARGE NOTE NURSING/SOCIAL WORK/PSYCH REHAB - NSDCADDINFO2FT_PSY_ALL_CORE
You have an IN-PERSON initial psychiatric evaluation scheduled for: Thurs. 2/15 at 1:30pm w/ Dr. Jaffe. It is very important that you attend this appointment to continue with your medication management.

## 2024-02-05 NOTE — BH INPATIENT PSYCHIATRY PROGRESS NOTE - NSBHFUPINTERVALHXFT_PSY_A_CORE
Is irregular very patient states that he feels better and he explains that just shock of being on inpatient unit was eye-opener.  He he continues to worry about his immigration status.  He is asking for letter from our unit to clarify that he is in the hospital and cannot make it on Tuesday, February 7.  Left leg is to be sent to his .  Patient says that he was able to sleep and he is eating better.  He is mood is improving.  He currently denies any thoughts about harming himself or anybody else.  He is expressing more hope about his future and immigration process.  He denies any voices or seeing things.  He denies any paranoia.  Patient insist his wife or ex-wife to be called.
Patient's wife did not call.  He is informed that left message for her as he requested.    Patient is visibly noted and he is trying his best to convince this writer that he is doing a lot better and he does not need to stay here.  In that sense he denies being depressed or anxious.  He denies having thoughts about harming self or anybody else.  He denies any paranoid thinking.  He denies anhedonia at this time and denies hopelessness.    
02/05/2024: Patient was seen today AM, chart reviewed and discussed in team. Patient is visible in unit, alert, oriented to time and place. He endorses that he feels much better, had his emotions in control by being here and feels that since he started the meds he is able to sleep well and have a better understanding  mood with no anxiety and SI now. He denied any prior SA  and feels that he need to face the  for his G. Card so he may have permanent status. He would go home and stay with his friend for now and to have 2 weeks supply of meds given from . He will f/u as per  referral.    Patient's wife did not call.  He is informed that left message for her as he requested.    Patient is visibly noted and he is trying his best to convince this writer that he is doing a lot better and he does not need to stay here.  In that sense he denies being depressed or anxious.  He denies having thoughts about harming self or anybody else.  He denies any paranoid thinking.  He denies anhedonia at this time and denies hopelessness.

## 2024-02-05 NOTE — BH INPATIENT PSYCHIATRY DISCHARGE NOTE - NSBHSAALC_PSY_A_CORE FT
Pt. endorses heavy ETOH use when employed in a [employer]. He stopped alcohol drink few months ago, last use 3 days ago 1 corona beer.

## 2024-02-05 NOTE — BH DISCHARGE NOTE NURSING/SOCIAL WORK/PSYCH REHAB - FLU SEASON?
----- Message from Angel Flaherty MD sent at 11/22/2021  9:49 AM EST -----  No change  How is he feeling  ----- Message -----  From: Micheal Cooper  Sent: 11/22/2021   8:57 AM EST  To: Angel Flaherty MD    INR- 2.7
Patient informed. Dr Miguel Co informed that patient is feeling a little better and has an appointment scheduled with Dr Lacho Calvo this week.
Yes...

## 2024-02-05 NOTE — BH INPATIENT PSYCHIATRY PROGRESS NOTE - NSCGIIMPROVESX_PSY_ALL_CORE
2 = Much improved - notably better with signficant reduction of symptoms; increase in the level of functioning but some symptoms remain
3 = Minimally improved - slightly better with little or no clinically meaningful reduction of symptoms.  Represents very little change in basic clinical status, level of care, or functional capacity.
4 = No change - symptoms remain essentially unchanged

## 2024-02-05 NOTE — BH INPATIENT PSYCHIATRY DISCHARGE NOTE - NSBHASSESSSUMMFT_PSY_ALL_CORE
Patient is reporting feeling depressed, initially has SI, no SI now, and no prior SA. he feels stressed due to loss of  Job/Home/Separation from wife/impending divorce/immigration papers etc. with poor sleep for past 3 months. He feels that he need to calm down and has no SI now, agreed to take Remeron 7.5 mg HS and discussed meds s/e , benefits and advantages. To be observed for few days and he also has immigration interview on 02/07/2024, and feels that he has to leave early on the 5th of February to help with the . He also agreed to go for after care f/u as per  referral.    2/3/24:  Patient states that being in the inpatient unit is eye-opener for him and he feels slightly better.  However still depressed and anxious.  Worries about his immigration status.  He wants his wife to be contacted . This writer called [phone] message that Moody is in the hospital and they can call [phone]  Patient acute risk factors being mitigated by inpatient stay and medication.  His sleep is better.  He is still depressed and anxious but less he continues to worry.  He denies thoughts about killing himself, at this point he does not want to die and he is less hopeless.  He is expressing hope about completing his immigration process successfully and find a job.    2/4/24:  Patient states that he feels much better and he wants to be discharged.  He has no insurance and he worries about his financial situation.  Writer informed him that message was left for his wife but she never called back.  Patient states that he is doing really good and he wants to be discharged.    Increase Remeron to 15 mg at at bedtime.

## 2024-02-05 NOTE — BH TREATMENT PLAN - NSTXPLANTHERAPYSESSIONSFT_PSY_ALL_CORE
02-04-24  Type of therapy: Coping skills, Psychoeducation  Type of session: Group  Level of patient participation: Engaged, Participates  Duration of participation: 60 minutes  Therapy conducted by: Psych rehab  Therapy Summary: Patient attended group therapy and recreation today.

## 2024-02-05 NOTE — BH INPATIENT PSYCHIATRY PROGRESS NOTE - NSCGISEVERILLNESS_PSY_ALL_CORE
4 = Moderately ill – overt symptoms causing noticeable, but modest, functional impairment or distress; symptom level may warrant medication
3 = Mildly ill – clearly established symptoms with minimal, if any, distress or difficulty in social and occupational function
5 = Markedly ill - intrusive symptoms that distinctly impair social/occupational function or cause intrusive levels of distress

## 2024-02-05 NOTE — BH INPATIENT PSYCHIATRY PROGRESS NOTE - NSBHMETABOLIC_PSY_ALL_CORE_FT
BMI: BMI (kg/m2): 23 (02-01-24 @ 23:24)  HbA1c: A1C with Estimated Average Glucose Result: 5.4 % (02-02-24 @ 08:07)    Glucose:   BP: --Vital Signs Last 24 Hrs  T(C): 36.4 (02-05-24 @ 07:10), Max: 36.4 (02-05-24 @ 07:10)  T(F): 97.6 (02-05-24 @ 07:10), Max: 97.6 (02-05-24 @ 07:10)  HR: --  BP: --  BP(mean): --  RR: 16 (02-05-24 @ 07:10) (16 - 16)  SpO2: 100% (02-05-24 @ 07:10) (100% - 100%)    Orthostatic VS  02-05-24 @ 07:10  Lying BP: --/-- HR: --  Sitting BP: 121/84 HR: 94  Standing BP: 134/83 HR: 96  Site: upper right arm  Mode: electronic  Orthostatic VS  02-04-24 @ 07:40  Lying BP: --/-- HR: --  Sitting BP: 129/90 HR: 86  Standing BP: 121/92 HR: 97  Site: upper right arm  Mode: electronic    Lipid Panel: Date/Time: 02-02-24 @ 08:07  Cholesterol, Serum: 170  LDL Cholesterol Calculated: 113  HDL Cholesterol, Serum: 41  Total Cholesterol/HDL Ration Measurement: --  Triglycerides, Serum: 89  
BMI: BMI (kg/m2): 23 (02-01-24 @ 23:24)  HbA1c: A1C with Estimated Average Glucose Result: 5.4 % (02-02-24 @ 08:07)    Glucose:   BP: 126/82 (02-01-24 @ 23:24) (115/78 - 130/87)Vital Signs Last 24 Hrs  T(C): 36.7 (02-03-24 @ 08:24), Max: 36.7 (02-03-24 @ 08:24)  T(F): 98.1 (02-03-24 @ 08:24), Max: 98.1 (02-03-24 @ 08:24)  HR: --  BP: --  BP(mean): --  RR: 18 (02-03-24 @ 08:24) (18 - 18)  SpO2: 100% (02-03-24 @ 08:24) (100% - 100%)    Orthostatic VS  02-03-24 @ 08:24  Lying BP: --/-- HR: --  Sitting BP: 132/82 HR: 102  Standing BP: 153/84 HR: 105  Site: upper right arm  Mode: electronic  Orthostatic VS  02-02-24 @ 08:48  Lying BP: --/-- HR: --  Sitting BP: 117/81 HR: 88  Standing BP: 128/83 HR: 96  Site: upper right arm  Mode: electronic    Lipid Panel: Date/Time: 02-02-24 @ 08:07  Cholesterol, Serum: 170  LDL Cholesterol Calculated: 113  HDL Cholesterol, Serum: 41  Total Cholesterol/HDL Ration Measurement: --  Triglycerides, Serum: 89  
BMI: BMI (kg/m2): 23 (02-01-24 @ 23:24)  HbA1c: A1C with Estimated Average Glucose Result: 5.4 % (02-02-24 @ 08:07)    Glucose:   BP: 126/82 (02-01-24 @ 23:24) (115/78 - 130/87)Vital Signs Last 24 Hrs  T(C): 36.4 (02-04-24 @ 07:40), Max: 36.4 (02-04-24 @ 07:40)  T(F): 97.6 (02-04-24 @ 07:40), Max: 97.6 (02-04-24 @ 07:40)  HR: --  BP: --  BP(mean): --  RR: 2 (02-04-24 @ 07:40) (2 - 2)  SpO2: 99% (02-04-24 @ 07:40) (99% - 99%)    Orthostatic VS  02-04-24 @ 07:40  Lying BP: --/-- HR: --  Sitting BP: 129/90 HR: 86  Standing BP: 121/92 HR: 97  Site: upper right arm  Mode: electronic  Orthostatic VS  02-03-24 @ 08:24  Lying BP: --/-- HR: --  Sitting BP: 132/82 HR: 102  Standing BP: 153/84 HR: 105  Site: upper right arm  Mode: electronic    Lipid Panel: Date/Time: 02-02-24 @ 08:07  Cholesterol, Serum: 170  LDL Cholesterol Calculated: 113  HDL Cholesterol, Serum: 41  Total Cholesterol/HDL Ration Measurement: --  Triglycerides, Serum: 89
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
## 2024-02-05 NOTE — BH INPATIENT PSYCHIATRY DISCHARGE NOTE - NSBHFUPINTERVALHXFT_PSY_A_CORE
02/05/2024: Patient was seen today AM, chart reviewed and discussed in team. Patient is visible in unit, alert, oriented to time and place. He endorses that he feels much better, had his emotions in control by being here and feels that since he started the meds he is able to sleep well and have a better understanding  mood with no anxiety and SI now. He denied any prior SA  and feels that he need to face the  for his G. Card so he may have permanent status. He would go home and stay with his friend for now and to have 2 weeks supply of meds given from . He will f/u as per  referral.    Patient's wife did not call.  He is informed that left message for her as he requested.    Patient is visibly noted and he is trying his best to convince this writer that he is doing a lot better and he does not need to stay here.  In that sense he denies being depressed or anxious.  He denies having thoughts about harming self or anybody else.  He denies any paranoid thinking.  He denies anhedonia at this time and denies hopelessness.

## 2024-02-05 NOTE — BH TREATMENT PLAN - NSTXPROBDEPRES_PSY_ALL_CORE
Luis Fernando Josue is a 81 year old male who is here for medication follow up.  He is doing well.  He tried a different mask and is sleeping well.       He denies any chest pains or dyspnea.  No leg swelling.  He walks 5 days a week for 30 minutes.    I have reviewed and updated this patient's allergies, medications, problem list, social history, and family history today.    Current Outpatient Medications   Medication Sig   • metoPROLOL tartrate (LOPRESSOR) 25 MG tablet TAKE 1 TABLET BY MOUTH EVERY TWELVE HOURS    • atorvastatin (LIPITOR) 10 MG tablet TAKE 1 TABLET BY MOUTH ONE TIME A DAY AT NIGHT   • Glucosamine-Chondroitin (GLUCOSAMINE CHONDR COMPLEX) 500-400 MG Cap Take 1 tablet by mouth 2 times daily.    • ketoconazole (NIZORAL) 2 % cream Apply 1 application topically daily as needed. Indications: Skin Infection due to Candida Yeast   • aspirin 81 MG tablet Take 1 tablet by mouth daily.   • Doxylamine Succinate, Sleep, (SLEEP AID PO) Take  by mouth.     No current facility-administered medications for this visit.        Review of system:   see HPI; otherwise denies HEENT, NECK, RESP, CARDIAC, GI, , NEURO or PSYCH Sx    Physical Exam:  Visit Vitals  /58 (BP Location: RUE - Right upper extremity, Patient Position: Sitting, Cuff Size: Regular)   Pulse 60   Resp 20   Ht 5' 8\" (1.727 m)   Wt 95.5 kg   BMI 32.01 kg/m²     General appearance: alert, appears stated age, cooperative and no distress  Head: Normocephalic, without obvious abnormality, atraumatic  Neck: no adenopathy, no carotid bruit, no JVD and supple, symmetrical, trachea midline  Lungs: clear to auscultation bilaterally  Heart: regular rate and rhythm, S1, S2 normal, no murmur, click, rub or gallop  Extremities: no edema, redness or tenderness in the calves or thighs  Neurologic: Alert and oriented x3, normal strength and tone. Normal symmetric reflexes. Normal coordination and gait    Labs:No in-office labs done      Assessment/Plan:    Luis Fernando  was seen today for follow-up and imm/inj.    Diagnoses and all orders for this visit:    Hyperlipidemia, unspecified hyperlipidemia type- continue lipitor.  Needs fasting lipids  -     LIPID PANEL WITH REFLEX; Future    Coronary artery disease involving native coronary artery of native heart, angina presence unspecified- continue metoprolol.      CKD (chronic kidney disease) stage 3, GFR 30-59 ml/min (CMS/[facility])  -     MICROALBUMIN URINE RANDOM; Future    Need for vaccination  -     INFLUENZA ENHANCED HIGH DOSE SPLIT PRES FREE VACC, IM (FLUZONE-HIGH DOSE)        Body mass index is 32.01 kg/m².    BMI ASSESSMENT/PLAN:  30 minutes of physical activity a day and low fat, low carb diet.     Already has appt 4/2020                        DEPRESSIVE SYMPTOMS

## 2024-02-05 NOTE — BH INPATIENT PSYCHIATRY PROGRESS NOTE - NSDCCRITERIA_PSY_ALL_CORE
When no longer Depressed and has no SI
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
## 2024-02-05 NOTE — BH DISCHARGE NOTE NURSING/SOCIAL WORK/PSYCH REHAB - NSBHCRISISRESOURCESOTHER_PSY_ALL_CORE_FT
Please contact Dr. Menjivar for medication or treatment questions, lab results or any other concerns at [phone]. Handoff provided to your outpatient provider, [facility]. Patient has agreed to receive a copy of their discharge note in the patient portal.  If needed, please contact [facility], 5N, 24/7 days a week and speak with Jena Davis RN Nurse manager or any 5N staff member.  Please return to the ED if symptoms worsen or return.
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
## 2024-02-05 NOTE — BH INPATIENT PSYCHIATRY PROGRESS NOTE - PRN MEDS
MEDICATIONS  (PRN):  LORazepam     Tablet 1 milliGRAM(s) Oral every 6 hours PRN Anxiety  

## 2024-02-05 NOTE — BH TREATMENT PLAN - NSTXDEPRESINTERRN_PSY_ALL_CORE
Establish trust/therapeutic rapport to encourage ventilation of feelings.  Provide emotional support.  Assess mood Q shift, document and report changes.  Encourage medication compliance, provide education, and monitor effects.  Encourage participation in unit activities with emphasis on coping/stress management.

## 2024-02-05 NOTE — BH INPATIENT PSYCHIATRY PROGRESS NOTE - NSBHCHARTREVIEWVS_PSY_A_CORE FT
Vital Signs Last 24 Hrs  T(C): 36.7 (02-03-24 @ 08:24), Max: 36.7 (02-03-24 @ 08:24)  T(F): 98.1 (02-03-24 @ 08:24), Max: 98.1 (02-03-24 @ 08:24)  HR: --  BP: --  BP(mean): --  RR: 18 (02-03-24 @ 08:24) (18 - 18)  SpO2: 100% (02-03-24 @ 08:24) (100% - 100%)    Orthostatic VS  02-03-24 @ 08:24  Lying BP: --/-- HR: --  Sitting BP: 132/82 HR: 102  Standing BP: 153/84 HR: 105  Site: upper right arm  Mode: electronic  Orthostatic VS  02-02-24 @ 08:48  Lying BP: --/-- HR: --  Sitting BP: 117/81 HR: 88  Standing BP: 128/83 HR: 96  Site: upper right arm  Mode: electronic  
Vital Signs Last 24 Hrs  T(C): 36.4 (02-05-24 @ 07:10), Max: 36.4 (02-05-24 @ 07:10)  T(F): 97.6 (02-05-24 @ 07:10), Max: 97.6 (02-05-24 @ 07:10)  HR: --  BP: --  BP(mean): --  RR: 16 (02-05-24 @ 07:10) (16 - 16)  SpO2: 100% (02-05-24 @ 07:10) (100% - 100%)    Orthostatic VS  02-05-24 @ 07:10  Lying BP: --/-- HR: --  Sitting BP: 121/84 HR: 94  Standing BP: 134/83 HR: 96  Site: upper right arm  Mode: electronic  Orthostatic VS  02-04-24 @ 07:40  Lying BP: --/-- HR: --  Sitting BP: 129/90 HR: 86  Standing BP: 121/92 HR: 97  Site: upper right arm  Mode: electronic  
Vital Signs Last 24 Hrs  T(C): 36.4 (02-04-24 @ 07:40), Max: 36.4 (02-04-24 @ 07:40)  T(F): 97.6 (02-04-24 @ 07:40), Max: 97.6 (02-04-24 @ 07:40)  HR: --  BP: --  BP(mean): --  RR: 2 (02-04-24 @ 07:40) (2 - 2)  SpO2: 99% (02-04-24 @ 07:40) (99% - 99%)    Orthostatic VS  02-04-24 @ 07:40  Lying BP: --/-- HR: --  Sitting BP: 129/90 HR: 86  Standing BP: 121/92 HR: 97  Site: upper right arm  Mode: electronic  Orthostatic VS  02-03-24 @ 08:24  Lying BP: --/-- HR: --  Sitting BP: 132/82 HR: 102  Standing BP: 153/84 HR: 105  Site: upper right arm  Mode: electronic

## 2024-02-05 NOTE — BH DISCHARGE NOTE NURSING/SOCIAL WORK/PSYCH REHAB - NSDCCRTYPESERV_GEN_ALL_CORE_FT
Crisis mental health and substance use resource including crisis hotline and drop-in center for evaluations, counseling, referrals, and, at times, medications refills

## 2024-02-05 NOTE — BH INPATIENT PSYCHIATRY PROGRESS NOTE - NSBHASSESSSUMMFT_PSY_ALL_CORE
Patient is reporting feeling depressed, initially has SI, no SI now, and no prior SA. he feels stressed due to loss of  Job/Home/Separation from wife/impending divorce/immigration papers etc. with poor sleep for past 3 months. He feels that he need to calm down and has no SI now, agreed to take Remeron 7.5 mg HS and discussed meds s/e , benefits and advantages. To be observed for few days and he also has immigration interview on 02/07/2024, and feels that he has to leave early on the 5th of February to help with the . He also agreed to go for after care f/u as per  referral.    2/3/24:  Patient states that being in the inpatient unit is eye-opener for him and he feels slightly better.  However still depressed and anxious.  Worries about his immigration status.  He wants his wife to be contacted . This writer called [phone] message that Moody is in the hospital and they can call [phone]  Patient acute risk factors being mitigated by inpatient stay and medication.  His sleep is better.  He is still depressed and anxious but less he continues to worry.  He denies thoughts about killing himself, at this point he does not want to die and he is less hopeless.  He is expressing hope about completing his immigration process successfully and find a job.    2/4/24:  Patient states that he feels much better and he wants to be discharged.  He has no insurance and he worries about his financial situation.  Writer informed him that message was left for his wife but she never called back.  Patient states that he is doing really good and he wants to be discharged.    Increase Remeron to 15 mg at at bedtime.      
Patient is reporting feeling depressed, initially has SI, no SI now, and no prior SA. he feels stressed due to loss of  Job/Home/Separation from wife/impending divorce/immigration papers etc. with poor sleep for past 3 months. He feels that he need to calm down and has no SI now, agreed to take Remeron 7.5 mg HS and discussed meds s/e , benefits and advantages. To be observed for few days and he also has immigration interview on 02/07/2024, and feels that he has to leave early on the 5th of February to help with the . He also agreed to go for after care f/u as per  referral.    2/3/24:  Patient states that being in the inpatient unit is eye-opener for him and he feels slightly better.  However still depressed and anxious.  Worries about his immigration status.  He wants his wife to be contacted . This writer called [phone] message that Moody is in the hospital and they can call [phone]  Patient acute risk factors being mitigated by inpatient stay and medication.  His sleep is better.  He is still depressed and anxious but less he continues to worry.  He denies thoughts about killing himself, at this point he does not want to die and he is less hopeless.  He is expressing hope about completing his immigration process successfully and find a job.
Patient is reporting feeling depressed, initially has SI, no SI now, and no prior SA. he feels stressed due to loss of  Job/Home/Separation from wife/impending divorce/immigration papers etc. with poor sleep for past 3 months. He feels that he need to calm down and has no SI now, agreed to take Remeron 7.5 mg HS and discussed meds s/e , benefits and advantages. To be observed for few days and he also has immigration interview on 02/07/2024, and feels that he has to leave early on the 5th of February to help with the . He also agreed to go for after care f/u as per  referral.    2/3/24:  Patient states that being in the inpatient unit is eye-opener for him and he feels slightly better.  However still depressed and anxious.  Worries about his immigration status.  He wants his wife to be contacted . This writer called [phone] message that Moody is in the hospital and they can call [phone]  Patient acute risk factors being mitigated by inpatient stay and medication.  His sleep is better.  He is still depressed and anxious but less he continues to worry.  He denies thoughts about killing himself, at this point he does not want to die and he is less hopeless.  He is expressing hope about completing his immigration process successfully and find a job.    2/4/24:  Patient states that he feels much better and he wants to be discharged.  He has no insurance and he worries about his financial situation.  Writer informed him that message was left for his wife but she never called back.  Patient states that he is doing really good and he wants to be discharged.    Increase Remeron to 15 mg at at bedtime.

## 2024-02-05 NOTE — BH DISCHARGE NOTE NURSING/SOCIAL WORK/PSYCH REHAB - NSCDUDCCRISIS_PSY_A_CORE
.  [facility] for Children, Adults and Families  [address] [place] [address]  Mobile Crisis Hotline – [phone] [phone]/Other.../361 Suicide and Crisis Lifeline

## 2024-02-05 NOTE — BH DISCHARGE NOTE NURSING/SOCIAL WORK/PSYCH REHAB - NSDCADDINFO1FT_PSY_ALL_CORE
You have an IN-PERSON intake scheduled for:   Please plan to address your substance use needs in treatment with [facility].  You have an IN-PERSON therapist intake scheduled for: Mon. 2/12 at 10:30am  Please plan to address your substance use needs in treatment with [facility].   *Please arrive early and bring photo ID. If for any reason you need to cancel or re-schedule, please contact the clinic before your appointment.

## 2024-02-05 NOTE — BH INPATIENT PSYCHIATRY DISCHARGE NOTE - NSDCPROCEDURESFT_PSY_ALL_CORE
CBC-WNL  CMP-WNL  TSH-1.47  HBA1-C-5.4    Lipid Profile  LDL-113  HDL-41  TRG-89    GGC-[id number]  COVID-19-Negative

## 2024-02-05 NOTE — BH INPATIENT PSYCHIATRY PROGRESS NOTE - CURRENT MEDICATION
MEDICATIONS  (STANDING):  mirtazapine 15 milliGRAM(s) Oral at bedtime    MEDICATIONS  (PRN):  LORazepam     Tablet 1 milliGRAM(s) Oral every 6 hours PRN Anxiety  
MEDICATIONS  (STANDING):  mirtazapine 7.5 milliGRAM(s) Oral at bedtime    MEDICATIONS  (PRN):  LORazepam     Tablet 1 milliGRAM(s) Oral every 6 hours PRN Anxiety  
MEDICATIONS  (STANDING):  mirtazapine 15 milliGRAM(s) Oral at bedtime    MEDICATIONS  (PRN):  LORazepam     Tablet 1 milliGRAM(s) Oral every 6 hours PRN Anxiety

## 2024-02-05 NOTE — BH DISCHARGE NOTE NURSING/SOCIAL WORK/PSYCH REHAB - PATIENT PORTAL LINK FT
You can access the FollowMyHealth Patient Portal offered by [facility] by registering at the following website: [email or web address]. By joining [facility]’s FollowMyHealth portal, you will also be able to view your health information using other applications (apps) compatible with our system.
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
## 2024-02-05 NOTE — BH INPATIENT PSYCHIATRY PROGRESS NOTE - NSBHATTESTBILLING_PSY_A_CORE
36403-Zswjkvflxv OBS or IP - moderate complexity OR 35-49 mins
10845-Vjbzipvati OBS or IP - moderate complexity OR 35-49 mins
11784-Bchuownyyp OBS or IP - moderate complexity OR 35-49 mins

## 2024-02-05 NOTE — BH INPATIENT PSYCHIATRY DISCHARGE NOTE - NSBHMETABOLIC_PSY_ALL_CORE_FT
BMI: BMI (kg/m2): 23 (02-01-24 @ 23:24)  HbA1c: A1C with Estimated Average Glucose Result: 5.4 % (02-02-24 @ 08:07)    Glucose:   BP: --Vital Signs Last 24 Hrs  T(C): 36.4 (02-05-24 @ 07:10), Max: 36.4 (02-05-24 @ 07:10)  T(F): 97.6 (02-05-24 @ 07:10), Max: 97.6 (02-05-24 @ 07:10)  HR: --  BP: --  BP(mean): --  RR: 16 (02-05-24 @ 07:10) (16 - 16)  SpO2: 100% (02-05-24 @ 07:10) (100% - 100%)    Orthostatic VS  02-05-24 @ 07:10  Lying BP: --/-- HR: --  Sitting BP: 121/84 HR: 94  Standing BP: 134/83 HR: 96  Site: upper right arm  Mode: electronic  Orthostatic VS  02-04-24 @ 07:40  Lying BP: --/-- HR: --  Sitting BP: 129/90 HR: 86  Standing BP: 121/92 HR: 97  Site: upper right arm  Mode: electronic    Lipid Panel: Date/Time: 02-02-24 @ 08:07  Cholesterol, Serum: 170  LDL Cholesterol Calculated: 113  HDL Cholesterol, Serum: 41  Triglycerides, Serum: 89

## 2024-02-05 NOTE — BH INPATIENT PSYCHIATRY PROGRESS NOTE - GENERAL APPEARANCE
Deformities present/No deformities present

## 2024-02-05 NOTE — BH INPATIENT PSYCHIATRY DISCHARGE NOTE - NSDCCPCAREPLAN_GEN_ALL_CORE_FT
PRINCIPAL DISCHARGE DIAGNOSIS  Diagnosis: Current episode of major depressive disorder without prior episode, unspecified depression episode severity  Assessment and Plan of Treatment: Remeron 15 mg HS

## 2024-02-08 NOTE — CHART NOTE - NSCHARTNOTESELECT_GEN_ALL_CORE
24 hour follow up/Event Note
24 hour follow up/Event Note
Dietitian Brief Note
Follow up call/Event Note

## 2024-02-08 NOTE — CHART NOTE - NSCHARTNOTEFT_GEN_A_CORE
Unable to reach patient for 3 days.  Letter to be sent to address on file.
Attempted outreach to follow up with patient after discharge. Left a voicemail requesting a return call.
Attempted outreach to follow up with patient after discharge. Left a voicemail requesting call back.
Dietitian consulted for [facility] program. Verbal and written diet ed will be provided prior to discharge date (anticipated 2/5/24) for healthy food shopping on a budget including recipes and resource guides for food programs. Staff Dietitian to f/u w/ education on Monday as program does not occur over the weekend.

## 2024-02-11 DIAGNOSIS — Z11.52 ENCOUNTER FOR SCREENING FOR COVID-19: ICD-10-CM

## 2024-02-11 DIAGNOSIS — F41.9 ANXIETY DISORDER, UNSPECIFIED: ICD-10-CM

## 2024-02-11 DIAGNOSIS — F32.9 MAJOR DEPRESSIVE DISORDER, SINGLE EPISODE, UNSPECIFIED: ICD-10-CM

## 2024-02-11 DIAGNOSIS — R45.851 SUICIDAL IDEATIONS: ICD-10-CM

## 2024-03-01 NOTE — BH SOCIAL WORK CONFIRMATION FOLLOW UP NOTE - NSCOMMENTS_PSY_ALL_CORE
GM attempted to outreach pt. (cell: [phone]) to f/u on not linking to aftercare. Pt. did not answer and VM full so could not leave message. Pt. with plans to go to [place] for legal issue after d/c from hospital. GM did attempt to contact pt. friend Parish Wyman ([phone]) with whom pt. was staying after d/c. GM LM requesting call back or to have pt. call back. Tx. team updated. Will send letter to friend's address.
